# Patient Record
Sex: FEMALE | URBAN - METROPOLITAN AREA
[De-identification: names, ages, dates, MRNs, and addresses within clinical notes are randomized per-mention and may not be internally consistent; named-entity substitution may affect disease eponyms.]

---

## 2020-01-17 ENCOUNTER — OFFICE VISIT (OUTPATIENT)
Dept: URBAN - METROPOLITAN AREA CLINIC 17 | Facility: CLINIC | Age: 30
End: 2020-01-17
Payer: COMMERCIAL

## 2020-01-17 DIAGNOSIS — S05.00XA CORNEAL ABRASION WITHOUT FB OF EYE, INITIAL ENCOUNTER: Primary | ICD-10-CM

## 2020-01-17 PROCEDURE — 99203 OFFICE O/P NEW LOW 30 MIN: CPT | Performed by: OPHTHALMOLOGY

## 2020-01-17 RX ORDER — OFLOXACIN 3 MG/ML
0.3 % SOLUTION/ DROPS OPHTHALMIC
Qty: 5 | Refills: 0 | Status: ACTIVE
Start: 2020-01-17

## 2020-01-17 ASSESSMENT — INTRAOCULAR PRESSURE: OS: 10

## 2020-01-17 NOTE — IMPRESSION/PLAN
Impression: Corneal abrasion without FB of eye, initial encounter: S05.00xA. Plan: Discussed diagnosis in detail with patient. Advised patient of condition. Recommend D/C Sulfacetamide drops today. Recommend applying BCL today in OD and start Ofloxacin QID OD.

## 2020-01-20 ENCOUNTER — OFFICE VISIT (OUTPATIENT)
Dept: URBAN - METROPOLITAN AREA CLINIC 17 | Facility: CLINIC | Age: 30
End: 2020-01-20
Payer: COMMERCIAL

## 2020-01-20 DIAGNOSIS — S05.01XS CORNEAL ABRASION WITHOUT FB OF RIGHT EYE, SEQUELA: Primary | ICD-10-CM

## 2020-01-20 PROCEDURE — 99213 OFFICE O/P EST LOW 20 MIN: CPT | Performed by: OPHTHALMOLOGY

## 2020-01-20 RX ORDER — PREDNISOLONE ACETATE 10 MG/ML
1 % SUSPENSION/ DROPS OPHTHALMIC
Qty: 1 | Refills: 1 | Status: ACTIVE
Start: 2020-01-20

## 2020-01-20 NOTE — IMPRESSION/PLAN
Impression: Corneal abrasion without FB of right eye, sequela: S05. [id number] OD. Condition: improving. Plan: Removed BCTL today. Continue Ofloxacin QID OD. Start Pred Acetate QID OD (ERX'd). Recommend AFTs for comfort.